# Patient Record
Sex: FEMALE | Race: ASIAN | NOT HISPANIC OR LATINO | Employment: FULL TIME | URBAN - METROPOLITAN AREA
[De-identification: names, ages, dates, MRNs, and addresses within clinical notes are randomized per-mention and may not be internally consistent; named-entity substitution may affect disease eponyms.]

---

## 2017-12-19 ENCOUNTER — APPOINTMENT (OUTPATIENT)
Dept: DERMATOLOGY | Facility: IMAGING CENTER | Age: 22
End: 2017-12-19

## 2018-02-13 ENCOUNTER — OFFICE VISIT (OUTPATIENT)
Dept: BEHAVIORAL HEALTH | Facility: PHYSICIAN GROUP | Age: 23
End: 2018-02-13
Payer: COMMERCIAL

## 2018-02-13 DIAGNOSIS — F41.8 DEPRESSION WITH ANXIETY: ICD-10-CM

## 2018-02-13 PROCEDURE — 90791 PSYCH DIAGNOSTIC EVALUATION: CPT | Performed by: MARRIAGE & FAMILY THERAPIST
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
## 2018-04-02 ENCOUNTER — OFFICE VISIT (OUTPATIENT)
Dept: BEHAVIORAL HEALTH | Facility: PHYSICIAN GROUP | Age: 23
End: 2018-04-02
Payer: COMMERCIAL

## 2018-04-02 DIAGNOSIS — F41.8 ANXIETY WITH DEPRESSION: ICD-10-CM

## 2018-04-02 PROCEDURE — 90834 PSYTX W PT 45 MINUTES: CPT | Performed by: MARRIAGE & FAMILY THERAPIST

## 2018-04-02 NOTE — BH THERAPY
[facility]  Therapy Progress Note    Patient Name: Keiry Desai  Patient MRN: [id number]  Today's Date: 4/2/2018     Type of session:Individual psychotherapy  Length of session: 45 minutes  Persons in attendance:Patient    Subjective/New Info: pt said she is feeling better but her self esteem remains low and she at times feels unlovable. She began internet dating and she is going on her first date today for coffee    Objective/Observations:   Participation: Active verbal participation   Grooming: Casual   Cognition: Alert   Eye contact: Good   Mood: Depressed and Anxious   Affect: Full range   Thought process: Logical   Speech: Rate within normal limits   Other:     Diagnoses: No diagnosis found.     Current risk:   SUICIDE: Not applicable   Homicide: Not applicable   Self-harm: Not applicable   Relapse: Not applicable   Other:    Safety Plan reviewed? Not Indicated   If evidence of imminent risk is present, intervention/plan:     Therapeutic Intervention(s): Clarify:  Clarify feelings and Clarify thoughts, Cognitive modification, Role-playing, Self-care skills, Stressors assessed and Supportive psychotherapy    Treatment Goal(s)/Objective(s) addressed: id pt stressors, clarified feelings and thoughts, using cognitive modification tech's including role play helped pt nurture herself and add to self care skills, provided support for pt     Progress toward Treatment Goals: Mild improvement    Plan:return for 1:1  - Next appointment scheduled:  4/16/2018    JEREMIE Gomez  4/2/2018

## 2019-02-25 ENCOUNTER — OFFICE VISIT (OUTPATIENT)
Dept: BEHAVIORAL HEALTH | Facility: CLINIC | Age: 24
End: 2019-02-25
Payer: COMMERCIAL

## 2019-02-25 DIAGNOSIS — F41.8 DEPRESSION WITH ANXIETY: ICD-10-CM

## 2019-02-25 PROCEDURE — 90834 PSYTX W PT 45 MINUTES: CPT | Performed by: MARRIAGE & FAMILY THERAPIST
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
## 2019-04-16 ENCOUNTER — OFFICE VISIT (OUTPATIENT)
Dept: BEHAVIORAL HEALTH | Facility: CLINIC | Age: 24
End: 2019-04-16
Payer: COMMERCIAL

## 2019-04-16 DIAGNOSIS — F41.8 DEPRESSION WITH ANXIETY: ICD-10-CM

## 2019-04-16 PROCEDURE — 90834 PSYTX W PT 45 MINUTES: CPT | Performed by: MARRIAGE & FAMILY THERAPIST

## 2019-04-16 NOTE — BH THERAPY
" [facility]  Therapy Progress Note    Patient Name: Keiry Desai  Patient MRN: [id number]  Today's Date: 4/16/2019     Type of session:Individual psychotherapy  Length of session: 45 minutes  Persons in attendance:Patient    Subjective/New Info: pt is questioning her value, worth and sacred feminine ways    Objective/Observations:   Participation: Active verbal participation   Grooming: Casual   Cognition: Fully Oriented   Eye contact: Good   Mood: Depressed and Anxious   Affect: Sad and Tearful   Thought process: Goal-directed   Speech: Rate within normal limits and Volume within normal limits   Other:     Diagnoses: No diagnosis found.     Current risk:   SUICIDE: Not applicable   Homicide: Not applicable   Self-harm: Not applicable   Relapse: Not applicable   Other:    Safety Plan reviewed? No   If evidence of imminent risk is present, intervention/plan:     Therapeutic Intervention(s): Clarify:  Clarify feelings and Clarify thoughts, Cognitive modification, Maladaptive behavior addressed, Positive behavior reinforced, Self-care skills, Stressors assessed and Supportive psychotherapy    Treatment Goal(s)/Objective(s) addressed: id pt stressors including being rejected by a boy she likes, clarified pt has anxiety and sadness about being rejected, maladaptive behavior including judging herself as \"less than\" was confronted, using cognitive modification reframed pt's thinking into focusing on her strengths, reinforced positive truth statement about her being, encouraged self care and provided support for pt    Progress toward Treatment Goals: Mild improvement    Plan:  - \"Homework\" recommendation: cont to remind herself of the truth about her being    JEREMIE Gomez  4/16/2019                                   "

## 2019-05-29 ENCOUNTER — OFFICE VISIT (OUTPATIENT)
Dept: BEHAVIORAL HEALTH | Facility: CLINIC | Age: 24
End: 2019-05-29
Payer: COMMERCIAL

## 2019-05-29 DIAGNOSIS — F41.8 DEPRESSION WITH ANXIETY: ICD-10-CM

## 2019-05-29 PROCEDURE — 90834 PSYTX W PT 45 MINUTES: CPT | Performed by: MARRIAGE & FAMILY THERAPIST

## 2019-05-29 NOTE — BH THERAPY
" [facility]  Therapy Progress Note    Patient Name: Keiry Desai  Patient MRN: [id number]  Today's Date: 5/29/2019     Type of session:Individual psychotherapy  Length of session: 50 minutes  Persons in attendance:Patient    Subjective/New Info: pt is fearful of being alone and being unlovable    Objective/Observations:   Participation: Active verbal participation   Grooming: Neat   Cognition: Alert   Eye contact: Good   Mood: Depressed and Anxious   Affect: Sad   Thought process: Goal-directed   Speech: Rate within normal limits and Volume within normal limits   Other:     Diagnoses: No diagnosis found.     Current risk:   SUICIDE: Not applicable   Homicide: Not applicable   Self-harm: Not applicable   Relapse: Not applicable   Other:    Safety Plan reviewed? Not Indicated   If evidence of imminent risk is present, intervention/plan:     Therapeutic Intervention(s): Clarify:  Clarify feelings and Clarify thoughts, Cognitive modification, Positive behavior reinforced, Self-care skills, Stressors assessed and Supportive psychotherapy    Treatment Goal(s)/Objective(s) addressed: id pt stressors including stinking thinking, clarified pt is anxious and depressed about being alone and never had a bf, using cognitive modification helped pt take her power back, reinforced positive forgiving behaviors, encouraged self care and provided support for pt     Progress toward Treatment Goals: Mild improvement    Plan:  - \"Homework\" recommendation: empower herself    JEREMIE Gomez  5/29/2019                                   "

## 2019-07-29 ENCOUNTER — OFFICE VISIT (OUTPATIENT)
Dept: BEHAVIORAL HEALTH | Facility: CLINIC | Age: 24
End: 2019-07-29
Payer: COMMERCIAL

## 2019-07-29 DIAGNOSIS — F41.8 DEPRESSION WITH ANXIETY: ICD-10-CM

## 2019-07-29 PROCEDURE — 90834 PSYTX W PT 45 MINUTES: CPT | Performed by: MARRIAGE & FAMILY THERAPIST
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
## 2019-08-19 ENCOUNTER — OFFICE VISIT (OUTPATIENT)
Dept: BEHAVIORAL HEALTH | Facility: CLINIC | Age: 24
End: 2019-08-19
Payer: COMMERCIAL

## 2019-08-19 DIAGNOSIS — F41.8 DEPRESSION WITH ANXIETY: ICD-10-CM

## 2019-08-19 PROCEDURE — 90834 PSYTX W PT 45 MINUTES: CPT | Performed by: MARRIAGE & FAMILY THERAPIST

## 2019-08-19 NOTE — BH THERAPY
[facility]  Therapy Progress Note    Patient Name: Keiry Desai  Patient MRN: [id number]  Today's Date: 8/19/2019     Type of session:Individual psychotherapy  Length of session: 45 minutes  Persons in attendance:Patient    Subjective/New Info: pt was holding grudges against her spiritual teammates    Objective/Observations:   Participation: Active verbal participation   Grooming: Casual   Cognition: Alert   Eye contact: Good   Mood: Depressed and Anxious   Affect: Full range   Thought process: Goal-directed   Speech: Rate within normal limits and Volume within normal limits   Other:     Diagnoses: No diagnosis found.     Current risk:   SUICIDE: Not applicable   Homicide: Not applicable   Self-harm: Not applicable   Relapse: Not applicable   Other:    Safety Plan reviewed? Not Indicated   If evidence of imminent risk is present, intervention/plan:     Therapeutic Intervention(s): Clarify:  Clarify feelings and Clarify thoughts, Cognitive modification, Positive behavior reinforced, Self-care skills, Stressors assessed and Supportive psychotherapy    Treatment Goal(s)/Objective(s) addressed: id pt stressors including being angry and hurt with her team, clarified pt is holding grudges against her team, using cognitive mod helped pt forgive her team and her self, reinforced pt's positive forgiving behaviors, encouraged self care and provided support for pt     Progress toward Treatment Goals: Mild improvement    Plan:return for 1:1  - Next appointment scheduled:  10/1/2019    JEREMIE Gomez  8/19/2019

## 2019-09-06 ENCOUNTER — OFFICE VISIT (OUTPATIENT)
Dept: URGENT CARE | Facility: CLINIC | Age: 24
End: 2019-09-06
Payer: COMMERCIAL

## 2019-09-06 VITALS
SYSTOLIC BLOOD PRESSURE: 112 MMHG | OXYGEN SATURATION: 98 % | HEIGHT: 61 IN | HEART RATE: 85 BPM | DIASTOLIC BLOOD PRESSURE: 76 MMHG | TEMPERATURE: 99.1 F | RESPIRATION RATE: 12 BRPM | BODY MASS INDEX: 20.81 KG/M2 | WEIGHT: 110.2 LBS

## 2019-09-06 DIAGNOSIS — H92.01 OTALGIA, RIGHT: ICD-10-CM

## 2019-09-06 DIAGNOSIS — H61.23 BILATERAL IMPACTED CERUMEN: ICD-10-CM

## 2019-09-06 DIAGNOSIS — J06.9 UPPER RESPIRATORY TRACT INFECTION, UNSPECIFIED TYPE: ICD-10-CM

## 2019-09-06 DIAGNOSIS — J02.9 PHARYNGITIS, UNSPECIFIED ETIOLOGY: ICD-10-CM

## 2019-09-06 LAB
INT CON NEG: NEGATIVE
INT CON POS: POSITIVE
S PYO AG THROAT QL: NEGATIVE

## 2019-09-06 PROCEDURE — 99204 OFFICE O/P NEW MOD 45 MIN: CPT | Performed by: NURSE PRACTITIONER

## 2019-09-06 PROCEDURE — 87880 STREP A ASSAY W/OPTIC: CPT | Performed by: NURSE PRACTITIONER

## 2019-09-06 RX ORDER — GUAIFENESIN, PSEUDOEPHEDRINE HYDROCHLORIDE 600; 60 MG/1; MG/1
1 TABLET, EXTENDED RELEASE ORAL EVERY 12 HOURS
Qty: 60 TAB | Refills: 0 | Status: SHIPPED | OUTPATIENT
Start: 2019-09-06

## 2019-09-06 RX ORDER — BENZONATATE 100 MG/1
100 CAPSULE ORAL 3 TIMES DAILY PRN
Qty: 60 CAP | Refills: 0 | Status: SHIPPED | OUTPATIENT
Start: 2019-09-06

## 2019-09-06 RX ORDER — IBUPROFEN 600 MG/1
600 TABLET ORAL EVERY 6 HOURS PRN
Qty: 30 TAB | Refills: 0 | Status: SHIPPED | OUTPATIENT
Start: 2019-09-06

## 2019-09-06 ASSESSMENT — ENCOUNTER SYMPTOMS
SORE THROAT: 1
COUGH: 1
VOMITING: 0
DIARRHEA: 0
ABDOMINAL PAIN: 0

## 2019-09-06 NOTE — PROGRESS NOTES
Subjective:     Keiry Desai is a 24 y.o. female who presents for Otalgia (x 2 days.  Pt. was cleaning out her R ear with a Q-tip and thinks she may have injured it.  She states both ears were hurting prior, cough, sore throat. )      Symptoms 1 week ago, cough and sore throat. Increased throat pain. Hurt ear with cotton swab, right side. No blood or ear drainge. Ear pain 6/10. No hearing changes, pops sometimes. Productive clear , cream mucus with cough sometimes. Slight nasal congestion. No sick contacts. No history of recurrent ear infections. Has seasonal allergies, does not take meds for it, stating it doesn't help. Right ear pain worse. Pain started after q-tip.     Otalgia    The problem has been waxing and waning. There has been no fever. Associated symptoms include coughing and a sore throat. Pertinent negatives include no abdominal pain, diarrhea, ear discharge, hearing loss, rash or vomiting. She has tried nothing for the symptoms. There is no history of a chronic ear infection or hearing loss.       History reviewed. No pertinent past medical history.    History reviewed. No pertinent surgical history.    Social History     Socioeconomic History   • Marital status: Unknown     Spouse name: Not on file   • Number of children: Not on file   • Years of education: Not on file   • Highest education level: Not on file   Occupational History   • Not on file   Social Needs   • Financial resource strain: Not on file   • Food insecurity:     Worry: Not on file     Inability: Not on file   • Transportation needs:     Medical: Not on file     Non-medical: Not on file   Tobacco Use   • Smoking status: Former Smoker   • Smokeless tobacco: Never Used   • Tobacco comment: Pt. just tried it once.   Substance and Sexual Activity   • Alcohol use: Yes     Comment: socially   • Drug use: Never   • Sexual activity: Not on file   Lifestyle   • Physical activity:     Days per week: Not on file     Minutes per session: Not  "on file   • Stress: Not on file   Relationships   • Social connections:     Talks on phone: Not on file     Gets together: Not on file     Attends Hinduism service: Not on file     Active member of club or organization: Not on file     Attends meetings of clubs or organizations: Not on file     Relationship status: Not on file   • Intimate partner violence:     Fear of current or ex partner: Not on file     Emotionally abused: Not on file     Physically abused: Not on file     Forced sexual activity: Not on file   Other Topics Concern   • Not on file   Social History Narrative   • Not on file        History reviewed. No pertinent family history.     No Known Allergies    Review of Systems   Constitutional: Negative for chills and fever.   HENT: Positive for ear pain and sore throat. Negative for ear discharge, hearing loss and sinus pain.    Eyes: Negative for pain, discharge and redness.   Respiratory: Positive for cough. Negative for shortness of breath and wheezing.    Gastrointestinal: Negative for abdominal pain, diarrhea and vomiting.   Skin: Negative for rash.   Endo/Heme/Allergies: Positive for environmental allergies.   All other systems reviewed and are negative.       Objective:   /76   Pulse 85   Temp 37.3 °C (99.1 °F) (Temporal)   Resp 12   Ht 1.549 m (5' 1\")   Wt 50 kg (110 lb 3.2 oz)   LMP 08/06/2019   SpO2 98%   BMI 20.82 kg/m²     Physical Exam   Constitutional: She is oriented to person, place, and time. She appears well-developed. No distress.   HENT:   Head: Normocephalic and atraumatic.   Right Ear: Hearing and external ear normal. No lacerations. There is tenderness. No drainage or swelling. No mastoid tenderness.   Left Ear: Hearing and external ear normal. No drainage, swelling or tenderness. No mastoid tenderness.   Nose: Rhinorrhea present. Right sinus exhibits no maxillary sinus tenderness and no frontal sinus tenderness. Left sinus exhibits no maxillary sinus tenderness and " no frontal sinus tenderness.   Mouth/Throat: Uvula is midline, oropharynx is clear and moist and mucous membranes are normal. No oropharyngeal exudate or posterior oropharyngeal erythema. Tonsils are 1+ on the right. Tonsils are 1+ on the left. No tonsillar exudate.   Bilateral cerumen impaction. No canal redness, edema, or skin breakdown.    Bilateral ear irrigation. Right ear pain with irrigation. No redness, blood, or visible trauma to ear canal. View of bilateral TM remains obstructed with cerumen.    Eyes: Pupils are equal, round, and reactive to light. Conjunctivae and EOM are normal.   Neck: Normal range of motion. Neck supple.   Cardiovascular: Normal rate.   Pulmonary/Chest: Effort normal and breath sounds normal. No accessory muscle usage or stridor. No tachypnea. No respiratory distress. She has no decreased breath sounds. She has no wheezes. She has no rhonchi. She has no rales.   Non-productive cough noted.    Musculoskeletal: Normal range of motion.   Lymphadenopathy:        Head (right side): Tonsillar adenopathy present. No submental, no submandibular, no preauricular, no posterior auricular and no occipital adenopathy present.        Head (left side): Tonsillar adenopathy present. No submental, no submandibular, no preauricular, no posterior auricular and no occipital adenopathy present.   Neurological: She is alert and oriented to person, place, and time.   Skin: Skin is warm and dry. No rash noted.   Psychiatric: She has a normal mood and affect. Her behavior is normal. Judgment and thought content normal.   Vitals reviewed.      Assessment/Plan:   1. Upper respiratory tract infection, unspecified type  - benzonatate (TESSALON) 100 MG Cap; Take 1 Cap by mouth 3 times a day as needed for Cough.  Dispense: 60 Cap; Refill: 0  - pseudoephedrine-guaifenesin (MUCINEX D)  MG per tablet; Take 1 Tab by mouth every 12 hours.  Dispense: 60 Tab; Refill: 0    2. Bilateral impacted cerumen  -Bilateral ear  irrigation.  -OTC Debrox as directed.    3. Otalgia, right  - REFERRAL TO ENT    4. Pharyngitis, unspecified etiology  - POCT Rapid Strep A  - ibuprofen (MOTRIN) 600 MG Tab; Take 1 Tab by mouth every 6 hours as needed.  Dispense: 30 Tab; Refill: 0  Oral Hydration.  Symptomatic Care.  Centor Score: 2  Results for orders placed or performed in visit on 09/06/19   POCT Rapid Strep A   Result Value Ref Range    Rapid Strep Screen Negative     Internal Control Positive Positive     Internal Control Negative Negative      Follow up for increased or persistent ear pain, ear drainage or bleeding,  fevers, hearing changes, shortness of breath, weakness, tachycardia.     Differential diagnosis, natural history, supportive care, and indications for immediate follow-up discussed.

## 2019-09-06 NOTE — PATIENT INSTRUCTIONS
"OTC Debrox as directed.     Upper Respiratory Infection, Adult  Most upper respiratory infections (URIs) are a viral infection of the air passages leading to the lungs. A URI affects the nose, throat, and upper air passages. The most common type of URI is nasopharyngitis and is typically referred to as \"the common cold.\"  URIs run their course and usually go away on their own. Most of the time, a URI does not require medical attention, but sometimes a bacterial infection in the upper airways can follow a viral infection. This is called a secondary infection. Sinus and middle ear infections are common types of secondary upper respiratory infections.  Bacterial pneumonia can also complicate a URI. A URI can worsen asthma and chronic obstructive pulmonary disease (COPD). Sometimes, these complications can require emergency medical care and may be life threatening.  What are the causes?  Almost all URIs are caused by viruses. A virus is a type of germ and can spread from one person to another.  What increases the risk?  You may be at risk for a URI if:  · You smoke.  · You have chronic heart or lung disease.  · You have a weakened defense (immune) system.  · You are very young or very old.  · You have nasal allergies or asthma.  · You work in crowded or poorly ventilated areas.  · You work in health care facilities or schools.  What are the signs or symptoms?  Symptoms typically develop 2-3 days after you come in contact with a cold virus. Most viral URIs last 7-10 days. However, viral URIs from the influenza virus (flu virus) can last 14-18 days and are typically more severe. Symptoms may include:  · Runny or stuffy (congested) nose.  · Sneezing.  · Cough.  · Sore throat.  · Headache.  · Fatigue.  · Fever.  · Loss of appetite.  · Pain in your forehead, behind your eyes, and over your cheekbones (sinus pain).  · Muscle aches.  How is this diagnosed?  Your health care provider may diagnose a URI by:  · Physical " exam.  · Tests to check that your symptoms are not due to another condition such as:  ¨ Strep throat.  ¨ Sinusitis.  ¨ Pneumonia.  ¨ Asthma.  How is this treated?  A URI goes away on its own with time. It cannot be cured with medicines, but medicines may be prescribed or recommended to relieve symptoms. Medicines may help:  · Reduce your fever.  · Reduce your cough.  · Relieve nasal congestion.  Follow these instructions at home:  · Take medicines only as directed by your health care provider.  · Gargle warm saltwater or take cough drops to comfort your throat as directed by your health care provider.  · Use a warm mist humidifier or inhale steam from a shower to increase air moisture. This may make it easier to breathe.  · Drink enough fluid to keep your urine clear or pale yellow.  · Eat soups and other clear broths and maintain good nutrition.  · Rest as needed.  · Return to work when your temperature has returned to normal or as your health care provider advises. You may need to stay home longer to avoid infecting others. You can also use a face mask and careful hand washing to prevent spread of the virus.  · Increase the usage of your inhaler if you have asthma.  · Do not use any tobacco products, including cigarettes, chewing tobacco, or electronic cigarettes. If you need help quitting, ask your health care provider.  How is this prevented?  The best way to protect yourself from getting a cold is to practice good hygiene.  · Avoid oral or hand contact with people with cold symptoms.  · Wash your hands often if contact occurs.  There is no clear evidence that vitamin C, vitamin E, echinacea, or exercise reduces the chance of developing a cold. However, it is always recommended to get plenty of rest, exercise, and practice good nutrition.  Contact a health care provider if:  · You are getting worse rather than better.  · Your symptoms are not controlled by medicine.  · You have chills.  · You have worsening  shortness of breath.  · You have brown or red mucus.  · You have yellow or brown nasal discharge.  · You have pain in your face, especially when you bend forward.  · You have a fever.  · You have swollen neck glands.  · You have pain while swallowing.  · You have white areas in the back of your throat.  Get help right away if:  · You have severe or persistent:  ¨ Headache.  ¨ Ear pain.  ¨ Sinus pain.  ¨ Chest pain.  · You have chronic lung disease and any of the following:  ¨ Wheezing.  ¨ Prolonged cough.  ¨ Coughing up blood.  ¨ A change in your usual mucus.  · You have a stiff neck.  · You have changes in your:  ¨ Vision.  ¨ Hearing.  ¨ Thinking.  ¨ Mood.  This information is not intended to replace advice given to you by your health care provider. Make sure you discuss any questions you have with your health care provider.  Document Released: 06/13/2002 Document Revised: 08/20/2017 Document Reviewed: 03/25/2015  [employer] Interactive Patient Education © 2017 [employer]    Earwax Buildup  Your ears make a substance called earwax. It may also be called cerumen. Sometimes, too much earwax builds up in your ear canal. This can cause ear pain and make it harder for you to hear.  CAUSES  This condition is caused by too much earwax production or buildup.  RISK FACTORS  The following factors may make you more likely to develop this condition:  · Cleaning your ears often with swabs.  · Having narrow ear canals.  · Having earwax that is overly thick or sticky.  · Having eczema.  · Being dehydrated.  SYMPTOMS  Symptoms of this condition include:  · Reduced hearing.  · Ear drainage.  · Ear pain.  · Ear itch.  · A feeling of fullness in the ear or feeling that the ear is plugged.  · Ringing in the ear.  · Coughing.  DIAGNOSIS  Your health care provider can diagnose this condition based on your symptoms and medical history. Your health care provider will also do an ear exam to look inside your ear with a scope (otoscope).  You may also have a hearing test.  TREATMENT  Treatment for this condition includes:  · Over-the-counter or prescription ear drops to soften the earwax.  · Earwax removal by a health care provider. This may be done:  ¨ By flushing the ear with body-temperature water.  ¨ With a medical instrument that has a loop at the end (earwax curette).  ¨ With a suction device.  HOME CARE INSTRUCTIONS  · Take over-the-counter and prescription medicines only as told by your health care provider.  · Do not put any objects, including an ear swab, into your ear. You can clean the opening of your ear canal with a washcloth.  · Drink enough water to keep your urine clear or pale yellow.  · If you have frequent earwax buildup or you use hearing aids, consider seeing your health care provider every 6-12 months for routine preventive ear cleanings. Keep all follow-up visits as told by your health care provider.  SEEK MEDICAL CARE IF:  · You have ear pain.  · Your condition does not improve with treatment.  · You have hearing loss.  · You have blood, pus, or other fluid coming from your ear.  This information is not intended to replace advice given to you by your health care provider. Make sure you discuss any questions you have with your health care provider.  Document Released: 01/25/2006 Document Revised: 04/10/2017 Document Reviewed: 08/03/2016  [employer] Interactive Patient Education © 2017 [employer]  Pharyngitis  Pharyngitis is redness, pain, and swelling (inflammation) of your pharynx.  What are the causes?  Pharyngitis is usually caused by infection. Most of the time, these infections are from viruses (viral) and are part of a cold. However, sometimes pharyngitis is caused by bacteria (bacterial). Pharyngitis can also be caused by allergies. Viral pharyngitis may be spread from person to person by coughing, sneezing, and personal items or utensils (cups, forks, spoons, toothbrushes). Bacterial pharyngitis may be spread from  person to person by more intimate contact, such as kissing.  What are the signs or symptoms?  Symptoms of pharyngitis include:  · Sore throat.  · Tiredness (fatigue).  · Low-grade fever.  · Headache.  · Joint pain and muscle aches.  · Skin rashes.  · Swollen lymph nodes.  · Plaque-like film on throat or tonsils (often seen with bacterial pharyngitis).  How is this diagnosed?  Your health care provider will ask you questions about your illness and your symptoms. Your medical history, along with a physical exam, is often all that is needed to diagnose pharyngitis. Sometimes, a rapid strep test is done. Other lab tests may also be done, depending on the suspected cause.  How is this treated?  Viral pharyngitis will usually get better in 3-4 days without the use of medicine. Bacterial pharyngitis is treated with medicines that kill germs (antibiotics).  Follow these instructions at home:  · Drink enough water and fluids to keep your urine clear or pale yellow.  · Only take over-the-counter or prescription medicines as directed by your health care provider:  ¨ If you are prescribed antibiotics, make sure you finish them even if you start to feel better.  ¨ Do not take aspirin.  · Get lots of rest.  · Gargle with 8 oz of salt water (½ tsp of salt per 1 qt of water) as often as every 1-2 hours to soothe your throat.  · Throat lozenges (if you are not at risk for choking) or sprays may be used to soothe your throat.  Contact a health care provider if:  · You have large, tender lumps in your neck.  · You have a rash.  · You cough up green, yellow-brown, or bloody spit.  Get help right away if:  · Your neck becomes stiff.  · You drool or are unable to swallow liquids.  · You vomit or are unable to keep medicines or liquids down.  · You have severe pain that does not go away with the use of recommended medicines.  · You have trouble breathing (not caused by a stuffy nose).  This information is not intended to replace advice  given to you by your health care provider. Make sure you discuss any questions you have with your health care provider.  Document Released: 12/18/2006 Document Revised: 05/25/2017 Document Reviewed: 08/25/2014  [employer] Interactive Patient Education © 2017 [employer]    Otalgia  Otalgia is pain in or around the ear. When the pain is from the ear itself it is called primary otalgia. Pain may also be coming from somewhere else, like the head and neck. This is called secondary otalgia.   CAUSES   Causes of primary otalgia include:  · Middle ear infection.  · It can also be caused by injury to the ear or infection of the ear canal (swimmer's ear). Swimmer's ear causes pain, swelling and often drainage from the ear canal.  Causes of secondary otalgia include:  · Sinus infections.  · Allergies and colds that cause stuffiness of the nose and tubes that drain the ears (eustachian tubes).  · Dental problems like cavities, gum infections or teething.  · Sore Throat (tonsillitis and pharyngitis).  · Swollen glands in the neck.  · Infection of the bone behind the ear (mastoiditis).  · TMJ discomfort (problems with the joint between your jaw and your skull).  · Other problems such as nerve disorders, circulation problems, heart disease and tumors of the head and neck can also cause symptoms of ear pain. This is rare.  DIAGNOSIS   Evaluation, Diagnosis and Testing:  · Examination by your medical caregiver is recommended to evaluate and diagnose the cause of otalgia.  · Further testing or referral to a specialist may be indicated if the cause of the ear pain is not found and the symptom persists.  TREATMENT   · Your doctor may prescribe antibiotics if an ear infection is diagnosed.  · Pain relievers and topical analgesics may be recommended.  · It is important to take all medications as prescribed.  HOME CARE INSTRUCTIONS   · It may be helpful to sleep with the painful ear in the up position.  · A warm compress over the painful  ear may provide relief.  · A soft diet and avoiding gum may help while ear pain is present.  SEEK IMMEDIATE MEDICAL CARE IF:  · You develop severe pain, a high fever, repeated vomiting or dehydration.  · You develop extreme dizziness, headache, confusion, ringing in the ears (tinnitus) or hearing loss.  Document Released: 01/25/2006 Document Revised: 03/11/2013 Document Reviewed: 10/27/2010  [employer]® Patient Information ©2014 [employer].

## 2019-09-10 ASSESSMENT — ENCOUNTER SYMPTOMS
EYE DISCHARGE: 0
EYE PAIN: 0
WHEEZING: 0
EYE REDNESS: 0
CHILLS: 0
FEVER: 0
SHORTNESS OF BREATH: 0
SINUS PAIN: 0

## 2019-10-01 ENCOUNTER — OFFICE VISIT (OUTPATIENT)
Dept: BEHAVIORAL HEALTH | Facility: CLINIC | Age: 24
End: 2019-10-01
Payer: COMMERCIAL

## 2019-10-01 DIAGNOSIS — F43.21 ADJUSTMENT DISORDER WITH DEPRESSED MOOD: ICD-10-CM

## 2019-10-01 PROCEDURE — 90834 PSYTX W PT 45 MINUTES: CPT | Performed by: MARRIAGE & FAMILY THERAPIST

## 2019-10-01 NOTE — BH THERAPY
" [facility]  Therapy Progress Note    Patient Name: Keiry Desai  Patient MRN: [id number]  Today's Date: 10/1/2019     Type of session:Individual psychotherapy  Length of session: 45 minutes  Persons in attendance:Patient    Subjective/New Info: pt stated she has been neglecting her self care and her mood has been \"off\"    Objective/Observations:   Participation: Active verbal participation   Grooming: Casual   Cognition: Alert   Eye contact: Good   Mood: Depressed   Affect: Full range   Thought process: Logical   Speech: Rate within normal limits and Volume within normal limits   Other:     Diagnoses: No diagnosis found.     Current risk:   SUICIDE: Not applicable   Homicide: Not applicable   Self-harm: Not applicable   Relapse: Not applicable   Other:    Safety Plan reviewed? Not Indicated   If evidence of imminent risk is present, intervention/plan:     Therapeutic Intervention(s): Clarify:  Clarify feelings and Clarify thoughts, Cognitive modification, Self-care skills, Stressors assessed and Supportive psychotherapy    Treatment Goal(s)/Objective(s) addressed: id pt stressors including neglecting her self care, clarified pt has felt depressed, using cognitive mod helped pt nurture herself and recommit to her self care, encouraged cont self care and provided support for pt     Progress toward Treatment Goals: Mild improvement    Plan:  - \"Homework\" recommendation: nurture and self care with food, exercise and sleep    JEREMIE Gomez  10/1/2019                                     "

## 2019-10-15 ENCOUNTER — OFFICE VISIT (OUTPATIENT)
Dept: BEHAVIORAL HEALTH | Facility: CLINIC | Age: 24
End: 2019-10-15
Payer: COMMERCIAL

## 2019-10-15 DIAGNOSIS — F41.8 DEPRESSION WITH ANXIETY: ICD-10-CM

## 2019-10-15 DIAGNOSIS — Z63.4 BEREAVEMENT: ICD-10-CM

## 2019-10-15 PROCEDURE — 90834 PSYTX W PT 45 MINUTES: CPT | Performed by: MARRIAGE & FAMILY THERAPIST

## 2019-10-15 SDOH — SOCIAL STABILITY - SOCIAL INSECURITY: DISSAPEARANCE AND DEATH OF FAMILY MEMBER: Z63.4

## 2019-10-15 NOTE — BH THERAPY
" [facility]  Therapy Progress Note    Patient Name: Keiry Desai  Patient MRN: [id number]  Today's Date: 10/15/2019     Type of session:Individual psychotherapy  Length of session: 45 minutes  Persons in attendance:Patient    Subjective/New Info: pt is feeling lackluster and she is not doing good self care    Objective/Observations:   Participation: Active verbal participation   Grooming: Casual   Cognition: Alert   Eye contact: Good   Mood: Depressed and Anxious   Affect: Full range   Thought process: Goal-directed   Speech: Rate within normal limits and Volume within normal limits   Other:     Diagnoses: No diagnosis found.     Current risk:   SUICIDE: Not applicable   Homicide: Not applicable   Self-harm: Not applicable   Relapse: Not applicable   Other:    Safety Plan reviewed? Not Indicated   If evidence of imminent risk is present, intervention/plan:     Therapeutic Intervention(s): Clarify:  Clarify feelings and Clarify thoughts, Cognitive modification, Positive behavior reinforced, Self-care skills, Stressors assessed and Supportive psychotherapy    Treatment Goal(s)/Objective(s) addressed: id pt stressors including cousin  suddenly, clarified pt is sad, depressed, anxious and disappointed in herself, using cognitive mod helped pt have a better relationship with her ego, reinforced pt's nurturing ways toward her ego, encouraged self care and provided support for pt    Progress toward Treatment Goals: Mild improvement    Plan:  - \"Homework\" recommendation: nurture and deepen her relationship with her ego    JEREMIE Gomez  10/15/2019                                     "

## 2019-11-05 ENCOUNTER — APPOINTMENT (OUTPATIENT)
Dept: BEHAVIORAL HEALTH | Facility: CLINIC | Age: 24
End: 2019-11-05
Payer: COMMERCIAL

## 2019-11-19 ENCOUNTER — APPOINTMENT (OUTPATIENT)
Dept: BEHAVIORAL HEALTH | Facility: CLINIC | Age: 24
End: 2019-11-19
Payer: COMMERCIAL

## 2019-12-03 ENCOUNTER — OFFICE VISIT (OUTPATIENT)
Dept: BEHAVIORAL HEALTH | Facility: CLINIC | Age: 24
End: 2019-12-03
Payer: COMMERCIAL

## 2019-12-03 DIAGNOSIS — F41.8 DEPRESSION WITH ANXIETY: ICD-10-CM

## 2019-12-03 PROCEDURE — 90834 PSYTX W PT 45 MINUTES: CPT | Performed by: MARRIAGE & FAMILY THERAPIST

## 2019-12-03 NOTE — BH THERAPY
[facility]  Therapy Progress Note    Patient Name: Keiry Desai  Patient MRN: [id number]  Today's Date: 12/3/2019     Type of session:Individual psychotherapy  Length of session: 45 minutes  Persons in attendance:Patient    Subjective/New Info: pt's father had a serious talk about her finishing school in the next 2 years and moving out of the house    Objective/Observations:   Participation: Active verbal participation   Grooming: Casual   Cognition: Alert   Eye contact: Good   Mood: Depressed and Anxious   Affect: Full range   Thought process: Goal-directed   Speech: Rate within normal limits and Volume within normal limits   Other:     Diagnoses: No diagnosis found.     Current risk:   SUICIDE: Not applicable   Homicide: Not applicable   Self-harm: Not applicable   Relapse: Not applicable   Other:    Safety Plan reviewed? Not Indicated   If evidence of imminent risk is present, intervention/plan:     Therapeutic Intervention(s): Clarify:  Clarify feelings and Clarify thoughts, Cognitive modification, Positive behavior reinforced, Self-care skills, Stressors assessed and Supportive psychotherapy    Treatment Goal(s)/Objective(s) addressed: id pt stressors including dad confronting her about school and her future plans, clarified pt feels anxious, sad and pressured, using cognitive mod helped pt embrace her strengths, reinforced pt's positive accepting behaviors, encouraged self care and provided support for pt    Progress toward Treatment Goals: Mild improvement    Plan:return for 1:1  - Next appointment scheduled:  12/17/2019    JEREMIE Gomez  12/3/2019

## 2019-12-17 ENCOUNTER — OFFICE VISIT (OUTPATIENT)
Dept: BEHAVIORAL HEALTH | Facility: CLINIC | Age: 24
End: 2019-12-17
Payer: COMMERCIAL

## 2019-12-17 DIAGNOSIS — F41.8 DEPRESSION WITH ANXIETY: ICD-10-CM

## 2019-12-17 PROCEDURE — 90834 PSYTX W PT 45 MINUTES: CPT | Performed by: MARRIAGE & FAMILY THERAPIST

## 2024-01-29 ENCOUNTER — HOSPITAL ENCOUNTER (OUTPATIENT)
Dept: LAB | Facility: MEDICAL CENTER | Age: 29
End: 2024-01-29
Payer: COMMERCIAL

## 2024-01-29 LAB
ALBUMIN SERPL BCP-MCNC: 3.5 G/DL (ref 3.2–4.9)
ALP SERPL-CCNC: 68 U/L (ref 30–99)
ALT SERPL-CCNC: 26 U/L (ref 2–50)
AST SERPL-CCNC: 23 U/L (ref 12–45)
B-HCG SERPL-ACNC: <1 MIU/ML (ref 0–5)
BILIRUB CONJ SERPL-MCNC: <0.2 MG/DL (ref 0.1–0.5)
BILIRUB INDIRECT SERPL-MCNC: NORMAL MG/DL (ref 0–1)
BILIRUB SERPL-MCNC: 0.4 MG/DL (ref 0.1–1.5)
CHOLEST SERPL-MCNC: 209 MG/DL (ref 100–199)
HDLC SERPL-MCNC: 49 MG/DL
LDLC SERPL CALC-MCNC: 136 MG/DL
PROT SERPL-MCNC: 8.1 G/DL (ref 6–8.2)
TRIGL SERPL-MCNC: 122 MG/DL (ref 0–149)

## 2024-01-29 PROCEDURE — 80061 LIPID PANEL: CPT

## 2024-01-29 PROCEDURE — 36415 COLL VENOUS BLD VENIPUNCTURE: CPT

## 2024-01-29 PROCEDURE — 80076 HEPATIC FUNCTION PANEL: CPT

## 2024-01-29 PROCEDURE — 84702 CHORIONIC GONADOTROPIN TEST: CPT

## 2024-01-31 LAB — LDLC SERPL-MCNC: 143 MG/DL (ref 0–129)

## 2024-02-26 ENCOUNTER — HOSPITAL ENCOUNTER (OUTPATIENT)
Dept: LAB | Facility: MEDICAL CENTER | Age: 29
End: 2024-02-26
Payer: COMMERCIAL

## 2024-02-26 LAB
ALBUMIN SERPL BCP-MCNC: 4.3 G/DL (ref 3.2–4.9)
ALP SERPL-CCNC: 79 U/L (ref 30–99)
ALT SERPL-CCNC: 25 U/L (ref 2–50)
AST SERPL-CCNC: 26 U/L (ref 12–45)
BILIRUB CONJ SERPL-MCNC: <0.2 MG/DL (ref 0.1–0.5)
BILIRUB INDIRECT SERPL-MCNC: NORMAL MG/DL (ref 0–1)
BILIRUB SERPL-MCNC: 0.2 MG/DL (ref 0.1–1.5)
CHOLEST SERPL-MCNC: 250 MG/DL (ref 100–199)
HCG SERPL QL: NEGATIVE
HDLC SERPL-MCNC: 40 MG/DL
LDLC SERPL CALC-MCNC: 143 MG/DL
PROT SERPL-MCNC: 8 G/DL (ref 6–8.2)
TRIGL SERPL-MCNC: 336 MG/DL (ref 0–149)

## 2024-02-26 PROCEDURE — 36415 COLL VENOUS BLD VENIPUNCTURE: CPT

## 2024-02-26 PROCEDURE — 84703 CHORIONIC GONADOTROPIN ASSAY: CPT

## 2024-02-26 PROCEDURE — 80061 LIPID PANEL: CPT

## 2024-02-26 PROCEDURE — 80076 HEPATIC FUNCTION PANEL: CPT

## 2024-02-28 LAB — LDLC SERPL-MCNC: 155 MG/DL (ref 0–129)

## 2024-03-08 ENCOUNTER — HOSPITAL ENCOUNTER (OUTPATIENT)
Dept: LAB | Facility: MEDICAL CENTER | Age: 29
End: 2024-03-08
Payer: COMMERCIAL

## 2024-03-08 LAB
ALBUMIN SERPL BCP-MCNC: 4.8 G/DL (ref 3.2–4.9)
ALBUMIN/GLOB SERPL: 1.5 G/DL
ALP SERPL-CCNC: 66 U/L (ref 30–99)
ALT SERPL-CCNC: 26 U/L (ref 2–50)
ANION GAP SERPL CALC-SCNC: 13 MMOL/L (ref 7–16)
AST SERPL-CCNC: 27 U/L (ref 12–45)
BILIRUB SERPL-MCNC: 0.4 MG/DL (ref 0.1–1.5)
BUN SERPL-MCNC: 12 MG/DL (ref 8–22)
CALCIUM ALBUM COR SERPL-MCNC: 9.1 MG/DL (ref 8.5–10.5)
CALCIUM SERPL-MCNC: 9.7 MG/DL (ref 8.5–10.5)
CHLORIDE SERPL-SCNC: 102 MMOL/L (ref 96–112)
CHOLEST SERPL-MCNC: 188 MG/DL (ref 100–199)
CO2 SERPL-SCNC: 22 MMOL/L (ref 20–33)
CREAT SERPL-MCNC: 0.67 MG/DL (ref 0.5–1.4)
ERYTHROCYTE [DISTWIDTH] IN BLOOD BY AUTOMATED COUNT: 39.5 FL (ref 35.9–50)
FASTING STATUS PATIENT QL REPORTED: NORMAL
GFR SERPLBLD CREATININE-BSD FMLA CKD-EPI: 121 ML/MIN/1.73 M 2
GLOBULIN SER CALC-MCNC: 3.3 G/DL (ref 1.9–3.5)
GLUCOSE SERPL-MCNC: 81 MG/DL (ref 65–99)
HCT VFR BLD AUTO: 43.2 % (ref 37–47)
HDLC SERPL-MCNC: 53 MG/DL
HGB BLD-MCNC: 14.6 G/DL (ref 12–16)
LDLC SERPL CALC-MCNC: 126 MG/DL
MCH RBC QN AUTO: 30.5 PG (ref 27–33)
MCHC RBC AUTO-ENTMCNC: 33.8 G/DL (ref 32.2–35.5)
MCV RBC AUTO: 90.4 FL (ref 81.4–97.8)
PLATELET # BLD AUTO: 294 K/UL (ref 164–446)
PMV BLD AUTO: 11.8 FL (ref 9–12.9)
POTASSIUM SERPL-SCNC: 4.2 MMOL/L (ref 3.6–5.5)
PROT SERPL-MCNC: 8.1 G/DL (ref 6–8.2)
RBC # BLD AUTO: 4.78 M/UL (ref 4.2–5.4)
SODIUM SERPL-SCNC: 137 MMOL/L (ref 135–145)
TRIGL SERPL-MCNC: 44 MG/DL (ref 0–149)
TSH SERPL DL<=0.005 MIU/L-ACNC: 2.18 UIU/ML (ref 0.38–5.33)
WBC # BLD AUTO: 5.8 K/UL (ref 4.8–10.8)

## 2024-03-08 PROCEDURE — 84443 ASSAY THYROID STIM HORMONE: CPT

## 2024-03-08 PROCEDURE — 80061 LIPID PANEL: CPT

## 2024-03-08 PROCEDURE — 80053 COMPREHEN METABOLIC PANEL: CPT

## 2024-03-08 PROCEDURE — 36415 COLL VENOUS BLD VENIPUNCTURE: CPT

## 2024-03-08 PROCEDURE — 85027 COMPLETE CBC AUTOMATED: CPT

## 2024-03-15 ENCOUNTER — HOSPITAL ENCOUNTER (OUTPATIENT)
Dept: LAB | Facility: MEDICAL CENTER | Age: 29
End: 2024-03-15
Payer: COMMERCIAL

## 2024-03-15 PROCEDURE — 86364 TISS TRNSGLTMNASE EA IG CLAS: CPT

## 2024-03-15 PROCEDURE — 83690 ASSAY OF LIPASE: CPT

## 2024-03-15 PROCEDURE — 36415 COLL VENOUS BLD VENIPUNCTURE: CPT

## 2024-03-16 LAB — LIPASE SERPL-CCNC: 27 U/L (ref 11–82)

## 2024-03-18 LAB — TTG IGA SER IA-ACNC: 15.69 FLU (ref 0–4.99)

## 2024-06-02 ENCOUNTER — OFFICE VISIT (OUTPATIENT)
Dept: URGENT CARE | Facility: CLINIC | Age: 29
End: 2024-06-02
Payer: COMMERCIAL

## 2024-06-02 VITALS
OXYGEN SATURATION: 98 % | SYSTOLIC BLOOD PRESSURE: 102 MMHG | TEMPERATURE: 97.9 F | BODY MASS INDEX: 23.6 KG/M2 | RESPIRATION RATE: 16 BRPM | HEART RATE: 79 BPM | DIASTOLIC BLOOD PRESSURE: 64 MMHG | HEIGHT: 61 IN | WEIGHT: 125 LBS

## 2024-06-02 DIAGNOSIS — R10.2 PELVIC PAIN: ICD-10-CM

## 2024-06-02 LAB
APPEARANCE UR: CLEAR
BILIRUB UR STRIP-MCNC: NEGATIVE MG/DL
COLOR UR AUTO: YELLOW
GLUCOSE UR STRIP.AUTO-MCNC: NEGATIVE MG/DL
KETONES UR STRIP.AUTO-MCNC: NEGATIVE MG/DL
LEUKOCYTE ESTERASE UR QL STRIP.AUTO: NEGATIVE
NITRITE UR QL STRIP.AUTO: NEGATIVE
PH UR STRIP.AUTO: 6 [PH] (ref 5–8)
POCT INT CON NEG: NEGATIVE
POCT INT CON POS: NEGATIVE
POCT URINE PREGNANCY TEST: NEGATIVE
PROT UR QL STRIP: NEGATIVE MG/DL
RBC UR QL AUTO: NEGATIVE
SP GR UR STRIP.AUTO: <=1.005
UROBILINOGEN UR STRIP-MCNC: NORMAL MG/DL

## 2024-06-02 PROCEDURE — 81025 URINE PREGNANCY TEST: CPT | Performed by: NURSE PRACTITIONER

## 2024-06-02 PROCEDURE — 3078F DIAST BP <80 MM HG: CPT | Performed by: NURSE PRACTITIONER

## 2024-06-02 PROCEDURE — 3074F SYST BP LT 130 MM HG: CPT | Performed by: NURSE PRACTITIONER

## 2024-06-02 PROCEDURE — 99214 OFFICE O/P EST MOD 30 MIN: CPT | Performed by: NURSE PRACTITIONER

## 2024-06-02 PROCEDURE — 81002 URINALYSIS NONAUTO W/O SCOPE: CPT | Performed by: NURSE PRACTITIONER

## 2024-06-02 ASSESSMENT — ENCOUNTER SYMPTOMS
GASTROINTESTINAL NEGATIVE: 1
CARDIOVASCULAR NEGATIVE: 1
NEUROLOGICAL NEGATIVE: 1
RESPIRATORY NEGATIVE: 1
CONSTITUTIONAL NEGATIVE: 1
EYES NEGATIVE: 1
MUSCULOSKELETAL NEGATIVE: 1
PSYCHIATRIC NEGATIVE: 1

## 2024-06-02 ASSESSMENT — FIBROSIS 4 INDEX: FIB4 SCORE: 0.52

## 2024-06-03 ENCOUNTER — HOSPITAL ENCOUNTER (OUTPATIENT)
Dept: LAB | Facility: MEDICAL CENTER | Age: 29
End: 2024-06-03
Attending: NURSE PRACTITIONER
Payer: COMMERCIAL

## 2024-06-03 DIAGNOSIS — R10.2 PELVIC PAIN: ICD-10-CM

## 2024-06-03 LAB
ALBUMIN SERPL BCP-MCNC: 4.5 G/DL (ref 3.2–4.9)
ALBUMIN/GLOB SERPL: 1.4 G/DL
ALP SERPL-CCNC: 66 U/L (ref 30–99)
ALT SERPL-CCNC: 24 U/L (ref 2–50)
AMYLASE SERPL-CCNC: 49 U/L (ref 20–103)
ANION GAP SERPL CALC-SCNC: 13 MMOL/L (ref 7–16)
AST SERPL-CCNC: 27 U/L (ref 12–45)
BASOPHILS # BLD AUTO: 0.3 % (ref 0–1.8)
BASOPHILS # BLD: 0.02 K/UL (ref 0–0.12)
BILIRUB SERPL-MCNC: 0.4 MG/DL (ref 0.1–1.5)
BUN SERPL-MCNC: 13 MG/DL (ref 8–22)
CALCIUM ALBUM COR SERPL-MCNC: 9.1 MG/DL (ref 8.5–10.5)
CALCIUM SERPL-MCNC: 9.5 MG/DL (ref 8.5–10.5)
CHLORIDE SERPL-SCNC: 99 MMOL/L (ref 96–112)
CO2 SERPL-SCNC: 23 MMOL/L (ref 20–33)
CREAT SERPL-MCNC: 0.7 MG/DL (ref 0.5–1.4)
EOSINOPHIL # BLD AUTO: 0.31 K/UL (ref 0–0.51)
EOSINOPHIL NFR BLD: 4.6 % (ref 0–6.9)
ERYTHROCYTE [DISTWIDTH] IN BLOOD BY AUTOMATED COUNT: 38.1 FL (ref 35.9–50)
GFR SERPLBLD CREATININE-BSD FMLA CKD-EPI: 119 ML/MIN/1.73 M 2
GLOBULIN SER CALC-MCNC: 3.3 G/DL (ref 1.9–3.5)
GLUCOSE SERPL-MCNC: 93 MG/DL (ref 65–99)
HCT VFR BLD AUTO: 43.6 % (ref 37–47)
HGB BLD-MCNC: 15.1 G/DL (ref 12–16)
IMM GRANULOCYTES # BLD AUTO: 0.02 K/UL (ref 0–0.11)
IMM GRANULOCYTES NFR BLD AUTO: 0.3 % (ref 0–0.9)
LIPASE SERPL-CCNC: 20 U/L (ref 11–82)
LYMPHOCYTES # BLD AUTO: 2.43 K/UL (ref 1–4.8)
LYMPHOCYTES NFR BLD: 36.4 % (ref 22–41)
MCH RBC QN AUTO: 30.3 PG (ref 27–33)
MCHC RBC AUTO-ENTMCNC: 34.6 G/DL (ref 32.2–35.5)
MCV RBC AUTO: 87.4 FL (ref 81.4–97.8)
MONOCYTES # BLD AUTO: 0.31 K/UL (ref 0–0.85)
MONOCYTES NFR BLD AUTO: 4.6 % (ref 0–13.4)
NEUTROPHILS # BLD AUTO: 3.58 K/UL (ref 1.82–7.42)
NEUTROPHILS NFR BLD: 53.8 % (ref 44–72)
NRBC # BLD AUTO: 0 K/UL
NRBC BLD-RTO: 0 /100 WBC (ref 0–0.2)
PLATELET # BLD AUTO: 316 K/UL (ref 164–446)
PMV BLD AUTO: 9.9 FL (ref 9–12.9)
POTASSIUM SERPL-SCNC: 4.3 MMOL/L (ref 3.6–5.5)
PROT SERPL-MCNC: 7.8 G/DL (ref 6–8.2)
RBC # BLD AUTO: 4.99 M/UL (ref 4.2–5.4)
SODIUM SERPL-SCNC: 135 MMOL/L (ref 135–145)
WBC # BLD AUTO: 6.7 K/UL (ref 4.8–10.8)

## 2024-06-03 PROCEDURE — 80053 COMPREHEN METABOLIC PANEL: CPT

## 2024-06-03 PROCEDURE — 83690 ASSAY OF LIPASE: CPT

## 2024-06-03 PROCEDURE — 85025 COMPLETE CBC W/AUTO DIFF WBC: CPT

## 2024-06-03 PROCEDURE — 36415 COLL VENOUS BLD VENIPUNCTURE: CPT

## 2024-06-03 PROCEDURE — 82150 ASSAY OF AMYLASE: CPT

## 2024-06-03 NOTE — PROGRESS NOTES
"Verbal consent was acquired by the patient to use [employer] ambient listening note generation during this visit.    Subjective:     HPI:   History of Present Illness  The patient presents for evaluation of pelvic pain.    The patient initiated Accutane treatment in 02/2023, during which she experienced similar pain. A month later, she discontinued its use due to elevated cholesterol levels. However, her lab results, including lipase, pancreatitis, and cholesterol, returned normal results. Recently, she has been experiencing severe pelvic pain, which she describes as a cramping sensation that has progressively worsened. The pain occasionally radiates. Her last menstrual cycle occurred approximately 2 weeks ago. She denies any history of ovarian cysts, diarrhea, nausea, vomiting, fever, or chills. She also denies any issues with urination or bowel movements.    Supplemental Information  She went to a digestive health doctor because her celiac levels came up really high. They did a biopsy and they said she does not have celiac disease. They thought the pelvic pain might be from celiac disease. She has not had any pelvic ultrasounds or abdominal ultrasounds.   She does not drink alcohol.    Review of Systems   Constitutional: Negative.    HENT: Negative.     Eyes: Negative.    Respiratory: Negative.     Cardiovascular: Negative.    Gastrointestinal: Negative.    Genitourinary:         Bilateral pelvic pain   Musculoskeletal: Negative.    Skin: Negative.    Neurological: Negative.    Endo/Heme/Allergies: Negative.    Psychiatric/Behavioral: Negative.     All other systems reviewed and are negative.      Objective:     Exam:  /64   Pulse 79   Temp 36.6 °C (97.9 °F) (Temporal)   Resp 16   Ht 1.549 m (5' 1\")   Wt 56.7 kg (125 lb)   SpO2 98%   BMI 23.62 kg/m²  Body mass index is 23.62 kg/m².    Physical Exam  Vitals and nursing note reviewed.   Constitutional:       General: She is not in acute distress.     " Appearance: Normal appearance. She is not ill-appearing.   HENT:      Head: Normocephalic and atraumatic.      Nose: Nose normal.      Mouth/Throat:      Mouth: Mucous membranes are moist.      Pharynx: Oropharynx is clear.   Eyes:      Extraocular Movements: Extraocular movements intact.      Conjunctiva/sclera: Conjunctivae normal.      Pupils: Pupils are equal, round, and reactive to light.   Cardiovascular:      Rate and Rhythm: Normal rate and regular rhythm.      Pulses: Normal pulses.      Heart sounds: Normal heart sounds.   Pulmonary:      Effort: Pulmonary effort is normal.      Breath sounds: Normal breath sounds.   Abdominal:      General: Abdomen is flat. There is no distension.      Palpations: Abdomen is soft.      Tenderness: There is abdominal tenderness in the right lower quadrant and left lower quadrant. There is no right CVA tenderness, left CVA tenderness, guarding or rebound. Negative signs include Shelby's sign, Rovsing's sign, McBurney's sign, psoas sign and obturator sign.          Comments: Mild tenderness to palpation to the RLQ/LLQ bilaterally.  Abdominal exam is otherwise unremarkable without any peritoneal signs.    Musculoskeletal:         General: Normal range of motion.      Cervical back: Normal range of motion and neck supple.   Skin:     General: Skin is warm and dry.      Capillary Refill: Capillary refill takes less than 2 seconds.   Neurological:      General: No focal deficit present.      Mental Status: She is alert and oriented to person, place, and time.         Physical Exam      Results  Laboratory Studies  Urine test shows no UTI.      Lab Results/POC Test Results   Results for orders placed or performed in visit on 06/02/24   POCT Urinalysis   Result Value Ref Range    POC Color yellow Negative    POC Appearance clear Negative    POC Glucose negative Negative mg/dL    POC Bilirubin negative Negative mg/dL    POC Ketones negative Negative mg/dL    POC Specific Gravity  <=1.005 <1.005 - >1.030    POC Blood negative Negative    POC Urine PH 6.0 5.0 - 8.0    POC Protein negative Negative mg/dL    POC Urobiligen 0.2 E.U./dl Negative (0.2) mg/dL    POC Nitrites negative Negative    POC Leukocyte Esterase negative Negative   POCT PREGNANCY   Result Value Ref Range    POC Urine Pregnancy Test Negative     Internal Control Positive Negative     Internal Control Negative Negative              Assessment & Plan:     1. Pelvic pain  POCT Urinalysis    POCT PREGNANCY    US-PELVIC COMPLETE (TRANSABDOMINAL/TRANSVAGINAL) (COMBO)    CBC WITH DIFFERENTIAL    Comp Metabolic Panel    LIPASE    AMYLASE          Assessment & Plan  1. Pelvic pain.  Upon examination, no abnormalities were detected. A pelvic and abdominal ultrasound have been ordered. Additionally, lab work has been ordered as patient is concerned about pancreatitis s/p Accutane treatment which she discontinued one month ago.  She will be notified of her results when available, and any further treatment recommendations will be made based on those results. Patient was advised that if her pain becomes worse or she develops any alarm symptoms she will proceed to the ER for further evaluation.  If all results are negative, she will follow with primary.          I spent a total of 35 minutes with record review, exam, communication with the patient, communication with other providers, and documentation of this encounter.    Please note that this dictation was created using voice recognition software. I have made every reasonable attempt to correct obvious errors, but I expect that there are errors of grammar and possibly content that I did not discover before finalizing the note.

## 2024-06-04 ENCOUNTER — HOSPITAL ENCOUNTER (OUTPATIENT)
Dept: RADIOLOGY | Facility: MEDICAL CENTER | Age: 29
End: 2024-06-04
Attending: NURSE PRACTITIONER
Payer: COMMERCIAL

## 2024-06-04 DIAGNOSIS — R10.2 PELVIC PAIN: ICD-10-CM

## 2024-06-04 PROCEDURE — 76856 US EXAM PELVIC COMPLETE: CPT

## 2024-12-19 ENCOUNTER — OFFICE VISIT (OUTPATIENT)
Dept: URGENT CARE | Facility: CLINIC | Age: 29
End: 2024-12-19

## 2024-12-19 VITALS
TEMPERATURE: 98.6 F | HEART RATE: 73 BPM | BODY MASS INDEX: 24.58 KG/M2 | HEIGHT: 61 IN | WEIGHT: 130.2 LBS | DIASTOLIC BLOOD PRESSURE: 64 MMHG | RESPIRATION RATE: 20 BRPM | OXYGEN SATURATION: 96 % | SYSTOLIC BLOOD PRESSURE: 98 MMHG

## 2024-12-19 DIAGNOSIS — T14.8XXA PUNCTURE WOUND: ICD-10-CM

## 2024-12-19 PROCEDURE — 90715 TDAP VACCINE 7 YRS/> IM: CPT

## 2024-12-19 PROCEDURE — 90471 IMMUNIZATION ADMIN: CPT

## 2024-12-19 PROCEDURE — 99203 OFFICE O/P NEW LOW 30 MIN: CPT | Mod: 25 | Performed by: NURSE PRACTITIONER

## 2024-12-19 PROCEDURE — 3074F SYST BP LT 130 MM HG: CPT | Performed by: NURSE PRACTITIONER

## 2024-12-19 PROCEDURE — 3078F DIAST BP <80 MM HG: CPT | Performed by: NURSE PRACTITIONER

## 2024-12-19 RX ORDER — CEPHALEXIN 500 MG/1
500 CAPSULE ORAL 4 TIMES DAILY
Qty: 20 CAPSULE | Refills: 0 | Status: SHIPPED | OUTPATIENT
Start: 2024-12-19 | End: 2024-12-24

## 2024-12-19 ASSESSMENT — FIBROSIS 4 INDEX: FIB4 SCORE: 0.52

## 2024-12-19 NOTE — PROGRESS NOTES
Chief Complaint   Patient presents with    Foot Injury     Xtoday right foot injury/stick with alarm anti theft device/tag pin        HISTORY OF PRESENT ILLNESS: Patient is a pleasant 29 y.o. female who presents to urgent care today with concerns of a puncture wound.  Patient notes that she was walking in a department store this afternoon when she stepped on a security tag device, which punctured into her right shoe.  She pulled the device out of her shoe but believes it punctured the bottom of her right foot.  She came straight to urgent care for evaluation.  She cannot recall her last tetanus booster.    Patient Active Problem List    Diagnosis Date Noted    Bereavement 10/15/2019    Depression with anxiety 02/13/2018       Allergies:Patient has no known allergies.    Current Outpatient Medications Ordered in Epic   Medication Sig Dispense Refill    cephALEXin (KEFLEX) 500 MG Cap Take 1 Capsule by mouth 4 times a day for 5 days. 20 Capsule 0     No current Epic-ordered facility-administered medications on file.       History reviewed. No pertinent past medical history.    Social History     Tobacco Use    Smoking status: Former    Smokeless tobacco: Never    Tobacco comments:     Pt. just tried it once.   Vaping Use    Vaping status: Never Used   Substance Use Topics    Alcohol use: Not Currently     Comment: socially    Drug use: Never       No family status information on file.   History reviewed. No pertinent family history.    ROS:  Review of Systems   Constitutional: Negative for fever, chills, weight loss, malaise, and fatigue.   HENT: Negative for ear pain, nosebleeds, congestion, sore throat and neck pain.    Eyes: Negative for vision changes.   Neuro: Negative for headache, sensory changes, weakness, seizure, LOC.   Cardiovascular: Negative for chest pain, palpitations, orthopnea and leg swelling.   Respiratory: Negative for cough, sputum production, shortness of breath and wheezing.   Gastrointestinal:  "Negative for abdominal pain, nausea, vomiting or diarrhea.   Genitourinary: Negative for dysuria, urgency and frequency.  Musculoskeletal: Negative for falls, neck pain, back pain, joint pain, myalgias.   Skin: Positive for puncture wound.  Negative for rash, diaphoresis.     Exam:  BP 98/64   Pulse 73   Temp 37 °C (98.6 °F) (Temporal)   Resp 20   Ht 1.549 m (5' 1\")   Wt 59.1 kg (130 lb 3.2 oz)   SpO2 96%   General: well-nourished, well-developed female in NAD  Head: normocephalic, atraumatic  Eyes: PERRLA, no conjunctival injection, acuity grossly intact, lids normal.  Ears: normal shape and symmetry, no tenderness, no discharge. External canals are without any significant edema or erythema. Tympanic membranes are without any inflammation, no effusion. Gross auditory acuity is intact.  Nose: symmetrical without tenderness, no discharge.  Mouth/Throat: reasonable hygiene, no erythema, exudates or tonsillar enlargement.  Neck: no masses, range of motion within normal limits, no tracheal deviation. No obvious thyroid enlargement.   Lymph: no cervical adenopathy. No supraclavicular adenopathy.   Neuro: alert and oriented. Cranial nerves 1-12 grossly intact. No sensory deficit.   Cardiovascular: regular rate and rhythm. No edema.  Pulmonary: no distress. Chest is symmetrical with respiration, no wheezes, crackles, or rhonchi.   Musculoskeletal: no clubbing, appropriate muscle tone, gait is stable.  Right foot: Normal in appearance, no obvious puncture wound site, no tenderness or erythema.  Foot has full range of motion.  Skin: warm, dry, intact, no clubbing, no cyanosis, no rashes.   Psych: appropriate mood, affect, judgement.         Assessment/Plan:  1. Puncture wound  Tdap =>6yo IM    cephALEXin (KEFLEX) 500 MG Cap          Patient presents with puncture wound to plantar aspect of right foot.  Area irrigated well in clinic.  Tdap updated per patient. Contingent antibiotic prescription given to patient to fill " upon meeting criteria of guidelines discussed.   Supportive care, differential diagnoses, and indications for immediate follow-up discussed with patient.   Pathogenesis of diagnosis discussed including typical length and natural progression.   Instructed to return to clinic or nearest emergency department for any change in condition, further concerns, or worsening of symptoms.  Patient states understanding of the plan of care and discharge instructions.  Instructed to make an appointment, for follow up, with her primary care provider.        Please note that this dictation was created using voice recognition software. I have made every reasonable attempt to correct obvious errors, but I expect that there are errors of grammar and possibly content that I did not discover before finalizing the note.      LANCE Ocampo.